# Patient Record
Sex: MALE | Race: WHITE
[De-identification: names, ages, dates, MRNs, and addresses within clinical notes are randomized per-mention and may not be internally consistent; named-entity substitution may affect disease eponyms.]

---

## 2018-12-20 LAB
ALBUMIN SERPL BCP-MCNC: 3.4 G/DL (ref 3.4–5)
ALBUMIN/GLOB SERPL: 0.9 {RATIO} (ref 1.1–1.5)
ALP SERPL-CCNC: 92 IU/L (ref 46–116)
ALT SERPL W P-5'-P-CCNC: 28 U/L (ref 30–65)
ANION GAP SERPL CALCULATED.3IONS-SCNC: 7 MMOL/L (ref 8–16)
AST SERPL W P-5'-P-CCNC: 25 U/L (ref 10–37)
BASOPHILS # BLD AUTO: 0 X10'3 (ref 0–0.2)
BASOPHILS NFR BLD AUTO: 0.3 % (ref 0–1)
BILIRUB SERPL-MCNC: 0.3 MG/DL (ref 0–1)
BUN SERPL-MCNC: 17 MG/DL (ref 7–18)
BUN/CREAT SERPL: 20.7 (ref 5.4–32)
CALCIUM SERPL-MCNC: 8.5 MG/DL (ref 8.5–10.1)
CHLORIDE SERPL-SCNC: 106 MMOL/L (ref 99–107)
CO2 SERPL-SCNC: 28.2 MMOL/L (ref 24–32)
CREAT SERPL-MCNC: 0.82 MG/DL (ref 0.6–1.1)
EOSINOPHIL # BLD AUTO: 0.2 X10'3 (ref 0–0.9)
EOSINOPHIL NFR BLD AUTO: 2.4 % (ref 0–6)
ERYTHROCYTE [DISTWIDTH] IN BLOOD BY AUTOMATED COUNT: 14.3 % (ref 11.5–14.5)
GFR SERPL CREATININE-BSD FRML MDRD: 90 ML/MIN
GLUCOSE SERPL-MCNC: 141 MG/DL (ref 70–104)
HCT VFR BLD AUTO: 49.9 % (ref 42–52)
HGB BLD-MCNC: 16.6 G/DL (ref 14–17.9)
LYMPHOCYTES # BLD AUTO: 1.5 X10'3 (ref 1.1–4.8)
LYMPHOCYTES NFR BLD AUTO: 19.7 % (ref 21–51)
MCH RBC QN AUTO: 32.9 PG (ref 27–31)
MCHC RBC AUTO-ENTMCNC: 33.2 % (ref 33–36.5)
MCV RBC AUTO: 99 FL (ref 78–98)
MONOCYTES # BLD AUTO: 0.5 X10'3 (ref 0–0.9)
MONOCYTES NFR BLD AUTO: 6 % (ref 2–12)
NEUTROPHILS # BLD AUTO: 5.5 X10'3 (ref 1.8–7.7)
NEUTROPHILS NFR BLD AUTO: 71.6 % (ref 42–75)
PLATELET # BLD AUTO: 242 X10'3 (ref 140–440)
PMV BLD AUTO: 9 FL (ref 7.4–10.4)
POTASSIUM SERPL-SCNC: 4.1 MMOL/L (ref 3.4–5.1)
PROT SERPL-MCNC: 7.4 G/DL (ref 6.4–8.2)
RBC # BLD AUTO: 5.04 X10'6 (ref 4.7–6.1)
SODIUM SERPL-SCNC: 141 MMOL/L (ref 135–145)

## 2019-01-02 ENCOUNTER — HOSPITAL ENCOUNTER (INPATIENT)
Dept: HOSPITAL 94 - PAS IN | Age: 84
LOS: 6 days | Discharge: NURSING, REHAB OR LONG-TERM CARE | DRG: 470 | End: 2019-01-08
Attending: ORTHOPAEDIC SURGERY | Admitting: ORTHOPAEDIC SURGERY
Payer: OTHER GOVERNMENT

## 2019-01-02 VITALS — SYSTOLIC BLOOD PRESSURE: 129 MMHG | DIASTOLIC BLOOD PRESSURE: 58 MMHG

## 2019-01-02 VITALS — DIASTOLIC BLOOD PRESSURE: 60 MMHG | SYSTOLIC BLOOD PRESSURE: 138 MMHG

## 2019-01-02 VITALS — BODY MASS INDEX: 29.27 KG/M2 | HEIGHT: 67 IN | WEIGHT: 186.51 LBS

## 2019-01-02 VITALS — SYSTOLIC BLOOD PRESSURE: 137 MMHG | DIASTOLIC BLOOD PRESSURE: 67 MMHG

## 2019-01-02 VITALS — DIASTOLIC BLOOD PRESSURE: 61 MMHG | SYSTOLIC BLOOD PRESSURE: 139 MMHG

## 2019-01-02 VITALS — SYSTOLIC BLOOD PRESSURE: 130 MMHG | DIASTOLIC BLOOD PRESSURE: 55 MMHG

## 2019-01-02 VITALS — SYSTOLIC BLOOD PRESSURE: 139 MMHG | DIASTOLIC BLOOD PRESSURE: 69 MMHG

## 2019-01-02 VITALS — DIASTOLIC BLOOD PRESSURE: 58 MMHG | SYSTOLIC BLOOD PRESSURE: 134 MMHG

## 2019-01-02 VITALS — DIASTOLIC BLOOD PRESSURE: 54 MMHG | SYSTOLIC BLOOD PRESSURE: 124 MMHG

## 2019-01-02 VITALS — DIASTOLIC BLOOD PRESSURE: 58 MMHG | SYSTOLIC BLOOD PRESSURE: 127 MMHG

## 2019-01-02 VITALS — SYSTOLIC BLOOD PRESSURE: 128 MMHG | DIASTOLIC BLOOD PRESSURE: 54 MMHG

## 2019-01-02 VITALS — SYSTOLIC BLOOD PRESSURE: 134 MMHG | DIASTOLIC BLOOD PRESSURE: 55 MMHG

## 2019-01-02 VITALS — SYSTOLIC BLOOD PRESSURE: 139 MMHG | DIASTOLIC BLOOD PRESSURE: 68 MMHG

## 2019-01-02 VITALS — SYSTOLIC BLOOD PRESSURE: 142 MMHG | DIASTOLIC BLOOD PRESSURE: 76 MMHG

## 2019-01-02 VITALS — SYSTOLIC BLOOD PRESSURE: 131 MMHG | DIASTOLIC BLOOD PRESSURE: 58 MMHG

## 2019-01-02 VITALS — SYSTOLIC BLOOD PRESSURE: 126 MMHG | DIASTOLIC BLOOD PRESSURE: 47 MMHG

## 2019-01-02 VITALS — SYSTOLIC BLOOD PRESSURE: 136 MMHG | DIASTOLIC BLOOD PRESSURE: 58 MMHG

## 2019-01-02 VITALS — SYSTOLIC BLOOD PRESSURE: 119 MMHG | DIASTOLIC BLOOD PRESSURE: 52 MMHG

## 2019-01-02 VITALS — DIASTOLIC BLOOD PRESSURE: 71 MMHG | SYSTOLIC BLOOD PRESSURE: 143 MMHG

## 2019-01-02 DIAGNOSIS — Z79.899: ICD-10-CM

## 2019-01-02 DIAGNOSIS — D62: ICD-10-CM

## 2019-01-02 DIAGNOSIS — J44.9: ICD-10-CM

## 2019-01-02 DIAGNOSIS — Z72.0: ICD-10-CM

## 2019-01-02 DIAGNOSIS — Z79.01: ICD-10-CM

## 2019-01-02 DIAGNOSIS — Z71.6: ICD-10-CM

## 2019-01-02 DIAGNOSIS — F10.29: ICD-10-CM

## 2019-01-02 DIAGNOSIS — Z96.642: ICD-10-CM

## 2019-01-02 DIAGNOSIS — M17.11: Primary | ICD-10-CM

## 2019-01-02 DIAGNOSIS — I48.0: ICD-10-CM

## 2019-01-02 PROCEDURE — 94640 AIRWAY INHALATION TREATMENT: CPT

## 2019-01-02 PROCEDURE — 0SRC069 REPLACEMENT OF RIGHT KNEE JOINT WITH OXIDIZED ZIRCONIUM ON POLYETHYLENE SYNTHETIC SUBSTITUTE, CEMENTED, OPEN APPROACH: ICD-10-PCS | Performed by: ORTHOPAEDIC SURGERY

## 2019-01-02 PROCEDURE — 84443 ASSAY THYROID STIM HORMONE: CPT

## 2019-01-02 PROCEDURE — 93005 ELECTROCARDIOGRAM TRACING: CPT

## 2019-01-02 PROCEDURE — 97162 PT EVAL MOD COMPLEX 30 MIN: CPT

## 2019-01-02 PROCEDURE — 97116 GAIT TRAINING THERAPY: CPT

## 2019-01-02 PROCEDURE — 80051 ELECTROLYTE PANEL: CPT

## 2019-01-02 PROCEDURE — 82948 REAGENT STRIP/BLOOD GLUCOSE: CPT

## 2019-01-02 PROCEDURE — 97110 THERAPEUTIC EXERCISES: CPT

## 2019-01-02 PROCEDURE — 97530 THERAPEUTIC ACTIVITIES: CPT

## 2019-01-02 PROCEDURE — 80048 BASIC METABOLIC PNL TOTAL CA: CPT

## 2019-01-02 PROCEDURE — 85025 COMPLETE CBC W/AUTO DIFF WBC: CPT

## 2019-01-02 PROCEDURE — 36415 COLL VENOUS BLD VENIPUNCTURE: CPT

## 2019-01-02 PROCEDURE — 71046 X-RAY EXAM CHEST 2 VIEWS: CPT

## 2019-01-02 PROCEDURE — 87070 CULTURE OTHR SPECIMN AEROBIC: CPT

## 2019-01-02 PROCEDURE — 80053 COMPREHEN METABOLIC PANEL: CPT

## 2019-01-02 PROCEDURE — 3E0T3BZ INTRODUCTION OF ANESTHETIC AGENT INTO PERIPHERAL NERVES AND PLEXI, PERCUTANEOUS APPROACH: ICD-10-PCS

## 2019-01-02 PROCEDURE — 93306 TTE W/DOPPLER COMPLETE: CPT

## 2019-01-02 PROCEDURE — 8E0YXCZ ROBOTIC ASSISTED PROCEDURE OF LOWER EXTREMITY: ICD-10-PCS | Performed by: ORTHOPAEDIC SURGERY

## 2019-01-02 RX ADMIN — SODIUM CHLORIDE AND POTASSIUM CHLORIDE SCH MLS/HR: 4.5; 1.49 INJECTION, SOLUTION INTRAVENOUS at 23:24

## 2019-01-02 RX ADMIN — Medication SCH MLS/HR: at 15:45

## 2019-01-02 RX ADMIN — Medication SCH MLS/HR: at 23:23

## 2019-01-02 RX ADMIN — SODIUM CHLORIDE AND POTASSIUM CHLORIDE SCH MLS/HR: 4.5; 1.49 INJECTION, SOLUTION INTRAVENOUS at 12:21

## 2019-01-02 NOTE — NUR
ALL CRITERIA FOR TRANSFER TO THE FLOOR HAS BEEN ACHIEVED. VSS. BED LOW, CALL LIGHT AND VS. 
SET IN PLACE. RN PRESENT TO ACCEPT CARE. PATIENT RESTING COMFORTABLY IN BED. BELONGINGS SENT 
WITH PATIENT. DRESSINGS CDI. DENIES PAIN. ON-Q RUNNING AT 8MLS/HR. VSS. BED LOW, CALL LIGHT 
IN RIGHT HAND, ONE BAG IN CLOSET OF 4011B, FRIEND AT BEDSIDE. RIGHT KNEE DRESSING IS CDI, 
SCDS ON. PATIENT RESTING COMFORTABLY. MAXIM GIBSON PRESENT TO ASSIST IN SET UP AND CARE TAKEN 
OVER. 




-------------------------------------------------------------------------------

Addendum: 01/02/19 at 1220 by Jamil Kunz RN, RN

-------------------------------------------------------------------------------

Amended: Links added.

## 2019-01-02 NOTE — NUR
Received from OR via [facility]THO BED WITH [facility] , accompanied by Anesthesiologist MIRI and 
report given by Anesthesiolgist. PATIENT WITH 20G PIV IN RIGHT HAND, PATIENT WITH KNEE WRAP 
AND POWDER PACK TO RIGHT KNEE. NO DRAINAGE PRESENT. NATHANIEL DRAIN ON + DORSALIS PEDIS. SCD TO RAQUEL LEONARD'S VSS.

DENIES PAIN AT THIS TIME. ALBRIGHT CATHETER IN PLACE.


-------------------------------------------------------------------------------

Addendum: 01/02/19 at 1112 by Jamil Kunz RN, RN

-------------------------------------------------------------------------------

Amended: Links added.

## 2019-01-03 VITALS — SYSTOLIC BLOOD PRESSURE: 97 MMHG | DIASTOLIC BLOOD PRESSURE: 71 MMHG

## 2019-01-03 VITALS — DIASTOLIC BLOOD PRESSURE: 54 MMHG | SYSTOLIC BLOOD PRESSURE: 126 MMHG

## 2019-01-03 VITALS — DIASTOLIC BLOOD PRESSURE: 59 MMHG | SYSTOLIC BLOOD PRESSURE: 151 MMHG

## 2019-01-03 VITALS — SYSTOLIC BLOOD PRESSURE: 146 MMHG | DIASTOLIC BLOOD PRESSURE: 59 MMHG

## 2019-01-03 VITALS — DIASTOLIC BLOOD PRESSURE: 62 MMHG | SYSTOLIC BLOOD PRESSURE: 142 MMHG

## 2019-01-03 VITALS — DIASTOLIC BLOOD PRESSURE: 60 MMHG | SYSTOLIC BLOOD PRESSURE: 142 MMHG

## 2019-01-03 VITALS — SYSTOLIC BLOOD PRESSURE: 179 MMHG | DIASTOLIC BLOOD PRESSURE: 50 MMHG

## 2019-01-03 LAB
ANION GAP SERPL CALCULATED.3IONS-SCNC: 8 MMOL/L (ref 8–16)
BASOPHILS # BLD AUTO: 0 X10'3 (ref 0–0.2)
BASOPHILS NFR BLD AUTO: 0.3 % (ref 0–1)
CHLORIDE SERPL-SCNC: 105 MMOL/L (ref 99–107)
CO2 SERPL-SCNC: 24.2 MMOL/L (ref 24–32)
EOSINOPHIL # BLD AUTO: 0 X10'3 (ref 0–0.9)
EOSINOPHIL NFR BLD AUTO: 0 % (ref 0–6)
ERYTHROCYTE [DISTWIDTH] IN BLOOD BY AUTOMATED COUNT: 13.5 % (ref 11.5–14.5)
HCT VFR BLD AUTO: 38.4 % (ref 42–52)
HGB BLD-MCNC: 13 G/DL (ref 14–17.9)
LYMPHOCYTES # BLD AUTO: 0.9 X10'3 (ref 1.1–4.8)
LYMPHOCYTES NFR BLD AUTO: 5.7 % (ref 21–51)
MCH RBC QN AUTO: 34.1 PG (ref 27–31)
MCHC RBC AUTO-ENTMCNC: 33.9 % (ref 33–36.5)
MCV RBC AUTO: 100.4 FL (ref 78–98)
MONOCYTES # BLD AUTO: 1.1 X10'3 (ref 0–0.9)
MONOCYTES NFR BLD AUTO: 7 % (ref 2–12)
NEUTROPHILS # BLD AUTO: 14.1 X10'3 (ref 1.8–7.7)
NEUTROPHILS NFR BLD AUTO: 87 % (ref 42–75)
PLATELET # BLD AUTO: 195 X10'3 (ref 140–440)
PMV BLD AUTO: 9 FL (ref 7.4–10.4)
POTASSIUM SERPL-SCNC: 4.6 MMOL/L (ref 3.5–5.1)
RBC # BLD AUTO: 3.83 X10'6 (ref 4.7–6.1)
SODIUM SERPL-SCNC: 137 MMOL/L (ref 135–145)
WBC # BLD AUTO: 16.1 X10'3 (ref 4.5–11)

## 2019-01-03 RX ADMIN — SODIUM CHLORIDE AND POTASSIUM CHLORIDE SCH MLS/HR: 4.5; 1.49 INJECTION, SOLUTION INTRAVENOUS at 10:09

## 2019-01-03 RX ADMIN — THERA TABS SCH EACH: TAB at 07:30

## 2019-01-03 RX ADMIN — SODIUM CHLORIDE AND POTASSIUM CHLORIDE SCH MLS/HR: 4.5; 1.49 INJECTION, SOLUTION INTRAVENOUS at 05:52

## 2019-01-03 NOTE — NUR
Pt s/p surgery to rt knee. Pt currently on regular diet with documented PO intake 50-75% at 
dinner last PM. Pt seen at bedside states he has a good appetite and ate most of breakfast. 
Pt given written and verbal protein education with RD contact information. Will remain 
available.

-------------------------------------------------------------------------------

Addendum: 01/03/19 at 1150 by Renetta Lamas RD

-------------------------------------------------------------------------------

Amended: Links added.

## 2019-01-03 NOTE — NUR
pt is sitting up in the recliner in NAD, states no pain, On Q pump is functioning, set at 8 
ml/hr. 

NATHANIEL dsg intact, small sanginous drainage circled, will continue to monitor

-------------------------------------------------------------------------------

Addendum: 01/03/19 at 1317 by Candace Farrell RN

-------------------------------------------------------------------------------

Amended: Links added.

## 2019-01-04 VITALS — SYSTOLIC BLOOD PRESSURE: 129 MMHG | DIASTOLIC BLOOD PRESSURE: 69 MMHG

## 2019-01-04 VITALS — SYSTOLIC BLOOD PRESSURE: 119 MMHG | DIASTOLIC BLOOD PRESSURE: 76 MMHG

## 2019-01-04 VITALS — DIASTOLIC BLOOD PRESSURE: 66 MMHG | SYSTOLIC BLOOD PRESSURE: 150 MMHG

## 2019-01-04 VITALS — DIASTOLIC BLOOD PRESSURE: 41 MMHG | SYSTOLIC BLOOD PRESSURE: 134 MMHG

## 2019-01-04 LAB
BASOPHILS # BLD AUTO: 0.1 X10'3 (ref 0–0.2)
BASOPHILS NFR BLD AUTO: 0.9 % (ref 0–1)
EOSINOPHIL # BLD AUTO: 0.1 X10'3 (ref 0–0.9)
EOSINOPHIL NFR BLD AUTO: 1.3 % (ref 0–6)
ERYTHROCYTE [DISTWIDTH] IN BLOOD BY AUTOMATED COUNT: 13.6 % (ref 11.5–14.5)
HCT VFR BLD AUTO: 37.2 % (ref 42–52)
HGB BLD-MCNC: 12.8 G/DL (ref 14–17.9)
LYMPHOCYTES # BLD AUTO: 1.5 X10'3 (ref 1.1–4.8)
LYMPHOCYTES NFR BLD AUTO: 17.5 % (ref 21–51)
MCH RBC QN AUTO: 33.9 PG (ref 27–31)
MCHC RBC AUTO-ENTMCNC: 34.3 % (ref 33–36.5)
MCV RBC AUTO: 98.8 FL (ref 78–98)
MONOCYTES # BLD AUTO: 0.8 X10'3 (ref 0–0.9)
MONOCYTES NFR BLD AUTO: 10 % (ref 2–12)
NEUTROPHILS # BLD AUTO: 5.9 X10'3 (ref 1.8–7.7)
NEUTROPHILS NFR BLD AUTO: 70.3 % (ref 42–75)
PLATELET # BLD AUTO: 184 X10'3 (ref 140–440)
PMV BLD AUTO: 8.8 FL (ref 7.4–10.4)
RBC # BLD AUTO: 3.77 X10'6 (ref 4.7–6.1)
WBC # BLD AUTO: 8.4 X10'3 (ref 4.5–11)

## 2019-01-04 RX ADMIN — OXYCODONE PRN MG: 5 TABLET ORAL at 07:04

## 2019-01-04 RX ADMIN — THERA TABS SCH EACH: TAB at 07:04

## 2019-01-04 RX ADMIN — OXYCODONE PRN MG: 5 TABLET ORAL at 16:17

## 2019-01-04 RX ADMIN — OXYCODONE PRN MG: 5 TABLET ORAL at 11:46

## 2019-01-05 VITALS — SYSTOLIC BLOOD PRESSURE: 144 MMHG | DIASTOLIC BLOOD PRESSURE: 67 MMHG

## 2019-01-05 VITALS — DIASTOLIC BLOOD PRESSURE: 65 MMHG | SYSTOLIC BLOOD PRESSURE: 162 MMHG

## 2019-01-05 VITALS — DIASTOLIC BLOOD PRESSURE: 39 MMHG | SYSTOLIC BLOOD PRESSURE: 118 MMHG

## 2019-01-05 VITALS — SYSTOLIC BLOOD PRESSURE: 127 MMHG | DIASTOLIC BLOOD PRESSURE: 75 MMHG

## 2019-01-05 LAB
BASOPHILS # BLD AUTO: 0 X10'3 (ref 0–0.2)
BASOPHILS NFR BLD AUTO: 0.3 % (ref 0–1)
EOSINOPHIL # BLD AUTO: 0.2 X10'3 (ref 0–0.9)
EOSINOPHIL NFR BLD AUTO: 1.8 % (ref 0–6)
ERYTHROCYTE [DISTWIDTH] IN BLOOD BY AUTOMATED COUNT: 14.1 % (ref 11.5–14.5)
HCT VFR BLD AUTO: 41.5 % (ref 42–52)
HGB BLD-MCNC: 13.8 G/DL (ref 14–17.9)
LYMPHOCYTES # BLD AUTO: 1.4 X10'3 (ref 1.1–4.8)
LYMPHOCYTES NFR BLD AUTO: 16 % (ref 21–51)
MCH RBC QN AUTO: 32.9 PG (ref 27–31)
MCHC RBC AUTO-ENTMCNC: 33.2 % (ref 33–36.5)
MCV RBC AUTO: 99.3 FL (ref 78–98)
MONOCYTES # BLD AUTO: 0.8 X10'3 (ref 0–0.9)
MONOCYTES NFR BLD AUTO: 8.7 % (ref 2–12)
NEUTROPHILS # BLD AUTO: 6.5 X10'3 (ref 1.8–7.7)
NEUTROPHILS NFR BLD AUTO: 73.2 % (ref 42–75)
PLATELET # BLD AUTO: 220 X10'3 (ref 140–440)
PMV BLD AUTO: 9.3 FL (ref 7.4–10.4)
RBC # BLD AUTO: 4.18 X10'6 (ref 4.7–6.1)
WBC # BLD AUTO: 8.8 X10'3 (ref 4.5–11)

## 2019-01-05 RX ADMIN — THERA TABS SCH EACH: TAB at 07:49

## 2019-01-05 RX ADMIN — OXYCODONE PRN MG: 5 TABLET ORAL at 01:43

## 2019-01-05 RX ADMIN — OXYCODONE PRN MG: 5 TABLET ORAL at 14:25

## 2019-01-05 RX ADMIN — OXYCODONE PRN MG: 5 TABLET ORAL at 07:49

## 2019-01-06 VITALS — DIASTOLIC BLOOD PRESSURE: 47 MMHG | SYSTOLIC BLOOD PRESSURE: 107 MMHG

## 2019-01-06 VITALS — SYSTOLIC BLOOD PRESSURE: 148 MMHG | DIASTOLIC BLOOD PRESSURE: 70 MMHG

## 2019-01-06 VITALS — SYSTOLIC BLOOD PRESSURE: 146 MMHG | DIASTOLIC BLOOD PRESSURE: 58 MMHG

## 2019-01-06 VITALS — SYSTOLIC BLOOD PRESSURE: 153 MMHG | DIASTOLIC BLOOD PRESSURE: 46 MMHG

## 2019-01-06 RX ADMIN — OXYCODONE PRN MG: 5 TABLET ORAL at 20:09

## 2019-01-06 RX ADMIN — OXYCODONE PRN MG: 5 TABLET ORAL at 03:12

## 2019-01-06 RX ADMIN — THERA TABS SCH EACH: TAB at 07:57

## 2019-01-07 VITALS — SYSTOLIC BLOOD PRESSURE: 137 MMHG | DIASTOLIC BLOOD PRESSURE: 67 MMHG

## 2019-01-07 VITALS — SYSTOLIC BLOOD PRESSURE: 128 MMHG | DIASTOLIC BLOOD PRESSURE: 66 MMHG

## 2019-01-07 VITALS — DIASTOLIC BLOOD PRESSURE: 75 MMHG | SYSTOLIC BLOOD PRESSURE: 146 MMHG

## 2019-01-07 VITALS — DIASTOLIC BLOOD PRESSURE: 69 MMHG | SYSTOLIC BLOOD PRESSURE: 155 MMHG

## 2019-01-07 RX ADMIN — ENOXAPARIN SODIUM SCH MG: 100 INJECTION SUBCUTANEOUS at 21:09

## 2019-01-07 RX ADMIN — THERA TABS SCH EACH: TAB at 08:53

## 2019-01-07 RX ADMIN — OXYCODONE PRN MG: 5 TABLET ORAL at 21:40

## 2019-01-07 NOTE — NUR
Patient in room ORTHO 4011. I have received report from MAXIM Cormier and had the opportunity 
to ask questions and assume patient care.

## 2019-01-07 NOTE — NUR
NURSES AIDE REPORTED DURRING 1800 VITAL SIGNS CHECK PATIENTS HEART RATE 150'S. PATIENT 
ASYMPTOMATIC. RATE SOUNDS FAIRLY REGULAR TO AUSCULTATION. DR. OLEARY CALLED AND REQUESTED EKG 
WHICH SHOWED A-FIB. CALLED DR. OLEARY BACK AND GAVE HIM DR. KERR NUMBER FOR A 
HOSPITALIST CONSULTATION. DR. MANCIA ORDERED ONE TIME DOSE OF IV PUSH METOPROLOL TO BE 
FOLLOWED BY PO METOPROLOL Q12H. IV WAS PLACED AND PATIENT WAS ALSO PLACED ON TELEMETRY 
MONITORING. IV MEDICATION SCANNED IN AND ADMINISTERED AT 2000 BY SIOMARA PAN FROM THE TELEMETRY 
UNIT. HEART RATE HALF AN HOUR LATER AT 2030 WAS STILL IN 'S. DR. MANCIA CALLED FOR 
UPDATE ON PATIENT AND RECOMMENDED GIVING ORDERED DOSE OF PO METOPROLOL AND KEEP MONITORING.

## 2019-01-07 NOTE — NUR
Initial: Pt s/p TKA-R PO 75% avg meeting needs down from 100% avg prior. No GI symptoms 
noted. Pt to receive beerx1 BIDLD per MD order. MCV 99.3 receiving MVI. LBM 1/7. No 
nutrition concerns at this time.

-------------------------------------------------------------------------------

Addendum: 01/07/19 at 1035 by Bakari Taylor RD

-------------------------------------------------------------------------------

Amended: Links added.

## 2019-01-08 VITALS — SYSTOLIC BLOOD PRESSURE: 138 MMHG | DIASTOLIC BLOOD PRESSURE: 58 MMHG

## 2019-01-08 VITALS — SYSTOLIC BLOOD PRESSURE: 138 MMHG

## 2019-01-08 LAB
ALBUMIN SERPL BCP-MCNC: 2.7 G/DL (ref 3.4–5)
ANION GAP SERPL CALCULATED.3IONS-SCNC: 9 MMOL/L (ref 8–16)
BUN SERPL-MCNC: 23 MG/DL (ref 7–18)
BUN/CREAT SERPL: 23 (ref 5.4–32)
CALCIUM SERPL-MCNC: 8.6 MG/DL (ref 8.5–10.1)
CHLORIDE SERPL-SCNC: 103 MMOL/L (ref 99–107)
CO2 SERPL-SCNC: 26.9 MMOL/L (ref 24–32)
CREAT SERPL-MCNC: 1 MG/DL (ref 0.6–1.1)
GFR SERPL CREATININE-BSD FRML MDRD: 72 ML/MIN
GLUCOSE SERPL-MCNC: 186 MG/DL (ref 70–104)
POTASSIUM SERPL-SCNC: 4.2 MMOL/L (ref 3.5–5.1)
SODIUM SERPL-SCNC: 139 MMOL/L (ref 135–145)

## 2019-01-08 RX ADMIN — OXYCODONE PRN MG: 5 TABLET ORAL at 05:35

## 2019-01-08 RX ADMIN — ENOXAPARIN SODIUM SCH MG: 100 INJECTION SUBCUTANEOUS at 07:15

## 2019-01-08 RX ADMIN — THERA TABS SCH EACH: TAB at 07:15

## 2019-01-08 NOTE — NUR
CHANGED DRESSING TO  R TOTAL KNEE WITH ISLAND DRESSING. BANDAGE OVER 2 STAPLES BELOW 
INCISION AND ABOVE INCISION.

## 2019-01-08 NOTE — NUR
Patient was discharged iv and tele was removed from patient. Report was called in. Patient 
was alert and oriented at time of discharge. Patient left with [facility] personnel

## 2019-01-08 NOTE — NUR
REPORT GIVEN TO JUAN ALBERTO PAN

-------------------------------------------------------------------------------

Addendum: 01/08/19 at 0612 by Madalyn Maldonado RN

-------------------------------------------------------------------------------

REPORT GIVEN TO AGUSTIN PAN